# Patient Record
(demographics unavailable — no encounter records)

---

## 2024-10-29 NOTE — REVIEW OF SYSTEMS
[Arthralgias] : arthralgias [As Noted in HPI] : as noted in HPI [Negative] : Heme/Lymph [FreeTextEntry9] : Chronic Back Pain (Works in Construction)

## 2024-10-29 NOTE — HISTORY OF PRESENT ILLNESS
[de-identified] : lump on back [de-identified] : This 59 yo M with 5 yrs h/o of back mass.  Patient states mass has increased in size gradually over the past few years.  Patient denies pain, fever, chills, redness or swelling.

## 2024-10-29 NOTE — PLAN
[FreeTextEntry1] : -Discuss the risks, benefits and options.  All the patient questions answered. -The patient would like to schedule excision of his right back mass. -Instruct patient to hold all ASA-containing products for at least 5 days prior to surgery. -Instruct patient to shower AM of surgery.

## 2024-10-29 NOTE — PRE-SURGICAL ASSESSMENT
[PST Chart Review] : PST Chart Review (low patient risk, very low procedural risk) [FreeTextEntry1] : Excision of soft tissue mass right upper back

## 2024-10-29 NOTE — PHYSICAL EXAM
[Normal Breath Sounds] : Normal breath sounds [Normal Heart Sounds] : normal heart sounds [Normal Rate and Rhythm] : normal rate and rhythm [Alert] : alert [Oriented to Person] : oriented to person [Oriented to Place] : oriented to place [Oriented to Time] : oriented to time [Calm] : calm [de-identified] : Obese (BMI 33.2) Black Male in NAD [de-identified] : No cervical adenopathy bilateral [de-identified] : No axial adenopathy bilateral  [de-identified] : Normal BS, soft, benign [de-identified] : 9 x 10 cm soft tissue mass upper right back  US: soft tissue mass appears to have consisttency of adipose tissue

## 2024-11-19 NOTE — HISTORY OF PRESENT ILLNESS
[de-identified] : s/p excision of back mass on 11/11/24 [de-identified] : Pt is without complaints, denies any pain in the area. Without drainage.

## 2024-11-19 NOTE — PHYSICAL EXAM
[Normal Breath Sounds] : Normal breath sounds [Normal Heart Sounds] : normal heart sounds [Normal Rate and Rhythm] : normal rate and rhythm [Calm] : calm [de-identified] : well developed black male in no distress [de-identified] : back incision clean and closed, without erythema or drainage.

## 2024-12-23 NOTE — PHYSICAL EXAM
[Normal Breath Sounds] : Normal breath sounds [Calm] : calm [Normal Heart Sounds] : normal heart sounds [de-identified] : well developed black male in no distress [de-identified] : benign [de-identified] : back incision is clean and closed.

## 2024-12-23 NOTE — HISTORY OF PRESENT ILLNESS
[de-identified] : s/p excision of back mass on 11/11/24 [de-identified] : pt doing well, without complaints, denies any pain or drainage.

## 2025-01-29 NOTE — HEALTH RISK ASSESSMENT
[Good] : ~his/her~  mood as  good [Yes] : Yes [No] : In the past 12 months have you used drugs other than those required for medical reasons? No [No falls in past year] : Patient reported no falls in the past year [Little interest or pleasure doing things] : 1) Little interest or pleasure doing things [Feeling down, depressed, or hopeless] : 2) Feeling down, depressed, or hopeless [0] : 2) Feeling down, depressed, or hopeless: Not at all (0) [Former] : Former [2] : 1) Little interest or pleasure doing things for more than half of the days (2) [PHQ-2 Positive] : PHQ-2 Positive

## 2025-03-15 NOTE — HISTORY OF PRESENT ILLNESS
[Gradual] : gradual [5] : 5 [8] : 8 [Radiating] : radiating [Tingling] : tingling [Intermittent] : intermittent [Sleep] : sleep [de-identified] : 3/15/25: 58 yo RHD male with low back pain and right wrist pain for months. 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 There is pain left side lower back. Denies radiating pain, numbness, tingling. There is aching pain in his wrist. He states pain is constant and worse with activities.  [] : Post Surgical Visit: no [FreeTextEntry1] : lower back and right hand  [FreeTextEntry3] : 6 weeks, [FreeTextEntry5] : no known injury, pt has had lower back pain for a while, RHD, states he has difficulty using a hammer. pain from his right wrist down to his fingers and pain is worse at night.  [FreeTextEntry6] : discomfort , "chronic pain" [FreeTextEntry7] : right writ to his hand  [FreeTextEntry9] : natural remedies  [de-identified] : movement [de-identified] : 1/29/2025 [de-identified] : primary care doctor

## 2025-03-15 NOTE — ASSESSMENT
[FreeTextEntry1] : Reviewed xrays and treatment options. Recommend MDP followed by diclofenac BID if needed. Advised not to take togteher.  Heat, rest. Activity modification. Start PT for L-spine. Discussed he may need MRI for spine to eval compression fx. He would like to try conservative care first. Follow up in 3-4 weeks.

## 2025-03-15 NOTE — PHYSICAL EXAM
[Right] : right hand [] : no tenderness over hand [Dorsal Wrist] : dorsal wrist [Distal Radius] : distal radius [Radial Styloid] : radial styloid [TFCC] : TFCC [Wrist Dorsiflexion] : wrist dorsiflexion [Wrist Volarflexion] : wrist volarflexion [FreeTextEntry9] : stiffness with wrist ROM in all planes

## 2025-03-15 NOTE — IMAGING
[Disc space narrowing] : Disc space narrowing [There are no fractures, subluxations or dislocations. No significant abnormalities are seen] : There are no fractures, subluxations or dislocations. No significant abnormalities are seen [Right] : right wrist [Degenerative change] : Degenerative change [FreeTextEntry1] : slight compression L4, L5